# Patient Record
Sex: FEMALE | Race: BLACK OR AFRICAN AMERICAN | NOT HISPANIC OR LATINO | URBAN - METROPOLITAN AREA
[De-identification: names, ages, dates, MRNs, and addresses within clinical notes are randomized per-mention and may not be internally consistent; named-entity substitution may affect disease eponyms.]

---

## 2020-10-25 ENCOUNTER — EMERGENCY (EMERGENCY)
Facility: HOSPITAL | Age: 70
LOS: 0 days | Discharge: HOME | End: 2020-10-25
Payer: COMMERCIAL

## 2020-10-25 VITALS
OXYGEN SATURATION: 100 % | DIASTOLIC BLOOD PRESSURE: 69 MMHG | TEMPERATURE: 98 F | HEART RATE: 60 BPM | WEIGHT: 182.98 LBS | SYSTOLIC BLOOD PRESSURE: 173 MMHG | HEIGHT: 65 IN | RESPIRATION RATE: 16 BRPM

## 2020-10-25 DIAGNOSIS — T14.90XA INJURY, UNSPECIFIED, INITIAL ENCOUNTER: ICD-10-CM

## 2020-10-25 DIAGNOSIS — E78.5 HYPERLIPIDEMIA, UNSPECIFIED: ICD-10-CM

## 2020-10-25 DIAGNOSIS — I10 ESSENTIAL (PRIMARY) HYPERTENSION: ICD-10-CM

## 2020-10-25 DIAGNOSIS — E11.9 TYPE 2 DIABETES MELLITUS WITHOUT COMPLICATIONS: ICD-10-CM
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
## 2020-10-25 DIAGNOSIS — M25.511 PAIN IN RIGHT SHOULDER: ICD-10-CM

## 2020-10-25 DIAGNOSIS — M79.641 PAIN IN RIGHT HAND: ICD-10-CM

## 2020-10-25 DIAGNOSIS — Z04.1 ENCOUNTER FOR EXAMINATION AND OBSERVATION FOLLOWING TRANSPORT ACCIDENT: ICD-10-CM
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
## 2020-10-25 DIAGNOSIS — Z91.013 ALLERGY TO SEAFOOD: ICD-10-CM

## 2020-10-25 PROCEDURE — 99284 EMERGENCY DEPT VISIT MOD MDM: CPT

## 2020-10-25 PROCEDURE — 73030 X-RAY EXAM OF SHOULDER: CPT | Mod: 26,RT

## 2020-10-25 PROCEDURE — 73130 X-RAY EXAM OF HAND: CPT | Mod: 26,RT

## 2020-10-25 RX ORDER — CYCLOBENZAPRINE HYDROCHLORIDE 10 MG/1
1 TABLET, FILM COATED ORAL
Qty: 10 | Refills: 0
Start: 2020-10-25 | End: 2020-11-03

## 2020-10-25 RX ORDER — CYCLOBENZAPRINE HYDROCHLORIDE 10 MG/1
5 TABLET, FILM COATED ORAL ONCE
Refills: 0 | Status: COMPLETED | OUTPATIENT
Start: 2020-10-25 | End: 2020-10-25

## 2020-10-25 RX ORDER — ACETAMINOPHEN 500 MG
1 TABLET ORAL
Qty: 28 | Refills: 0
Start: 2020-10-25 | End: 2020-10-31

## 2020-10-25 RX ORDER — ACETAMINOPHEN 500 MG
650 TABLET ORAL ONCE
Refills: 0 | Status: COMPLETED | OUTPATIENT
Start: 2020-10-25 | End: 2020-10-25

## 2020-10-25 RX ADMIN — CYCLOBENZAPRINE HYDROCHLORIDE 5 MILLIGRAM(S): 10 TABLET, FILM COATED ORAL at 12:12

## 2020-10-25 RX ADMIN — Medication 650 MILLIGRAM(S): at 12:12
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
## 2020-10-25 NOTE — ED ADULT NURSE NOTE - NS ED PATIENT SAFETY CONCERN
from front to back. When should you call for help? Call your doctor now or seek immediate medical care if:    · Symptoms such as fever, chills, nausea, or vomiting get worse or appear for the first time.     · You have new pain in your back just below your rib cage. This is called flank pain.     · There is new blood or pus in your urine.     · You have any problems with your antibiotic medicine.    Watch closely for changes in your health, and be sure to contact your doctor if:    · You are not getting better after taking an antibiotic for 2 days.     · Your symptoms go away but then come back. Where can you learn more? Go to [email or web address] and sign in to your [employer] account. Enter A169 in the [employer] box to learn more about \"Urinary Tract Infection in Women: Care Instructions. \"     If you do not have an account, please click on the \"Sign Up Now\" link. Current as of: March 20, 2018  Content Version: 11.9  © 6641-5096 [employer]. Care instructions adapted under license by [facility] ([facility]). If you have questions about a medical condition or this instruction, always ask your healthcare professional. [place] [address] any warranty or liability for your use of this information.
No

## 2020-10-25 NOTE — ED PROVIDER NOTE - PATIENT PORTAL LINK FT
You can access the FollowMyHealth Patient Portal offered by [facility] by registering at the following website: [email or web address]. By joining [employer]’s FollowMyHealth portal, you will also be able to view your health information using other applications (apps) compatible with our system.
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
## 2020-10-25 NOTE — ED PROVIDER NOTE - MUSCULOSKELETAL, MLM
Spine appears normal, range of motion is not limited, no muscle or joint tenderness RIGHT SHOULDER-+AROM, NO ERYTHEMA, NO STS, NO PULSE; RIGHT HAND- AROM, NON TENDER, NEG SNUFF BOX, NO ERYTHEMA, NO WARMTH, NO ABRASION MILD STS
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
## 2022-04-26 NOTE — ED PROVIDER NOTE - NSFOLLOWUPINSTRUCTIONS_ED_ALL_ED_FT
No. EBEN screening performed.  STOP BANG Legend: 0-2 = LOW Risk; 3-4 = INTERMEDIATE Risk; 5-8 = HIGH Risk ice, pain meds, elevation f/u w hand or orthopaedic in 1 week for persistent pain  Discussed results and outcome of testing with the patient.  Patient advised to please follow up with their primary care doctor within the next 24-48 hours and return to the Emergency Department for worsening symptoms or any other concerns.  Patient advised that their doctor may call  to follow up on the specific results of the tests performed today in the emergency department.

## 2022-07-06 NOTE — ED PROVIDER NOTE - CPE EDP MUSC NORM
----- Message from Starla White MD sent at 7/6/2022 12:45 PM CDT -----  Labs essentially normal, no cause of headache identified   normal...

## 2023-01-18 ENCOUNTER — OFFICE (OUTPATIENT)
Dept: URBAN - METROPOLITAN AREA CLINIC 77 | Facility: CLINIC | Age: 73
Setting detail: OPHTHALMOLOGY
End: 2023-01-18
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
## 2023-01-18 PROCEDURE — NO SHOW FE NO SHOW FEE: Performed by: OPHTHALMOLOGY

## 2023-03-23 ENCOUNTER — OFFICE (OUTPATIENT)
Dept: URBAN - METROPOLITAN AREA CLINIC 77 | Facility: CLINIC | Age: 73
Setting detail: OPHTHALMOLOGY
End: 2023-03-23
Payer: MEDICARE

## 2023-03-23 DIAGNOSIS — E11.3511: ICD-10-CM

## 2023-03-23 DIAGNOSIS — H43.12: ICD-10-CM

## 2023-03-23 DIAGNOSIS — H16.223: ICD-10-CM

## 2023-03-23 DIAGNOSIS — E11.3592: ICD-10-CM

## 2023-03-23 DIAGNOSIS — H25.11: ICD-10-CM

## 2023-03-23 DIAGNOSIS — H43.811: ICD-10-CM

## 2023-03-23 PROCEDURE — 92235 FLUORESCEIN ANGRPH MLTIFRAME: CPT | Performed by: OPHTHALMOLOGY

## 2023-03-23 PROCEDURE — 92250 FUNDUS PHOTOGRAPHY W/I&R: CPT | Performed by: OPHTHALMOLOGY

## 2023-03-23 PROCEDURE — 99214 OFFICE O/P EST MOD 30 MIN: CPT | Performed by: OPHTHALMOLOGY

## 2023-03-23 PROCEDURE — 67028 INJECTION EYE DRUG: CPT | Performed by: OPHTHALMOLOGY

## 2023-03-23 ASSESSMENT — SUPERFICIAL PUNCTATE KERATITIS (SPK)
OS_SPK: T
OD_SPK: T

## 2023-03-23 ASSESSMENT — CORNEAL EDEMA - FOLDS/STRIAE: OS_FOLDSSTRIAE: ABSENT

## 2023-03-23 ASSESSMENT — CONFRONTATIONAL VISUAL FIELD TEST (CVF)
OD_FINDINGS: FULL
OS_FINDINGS: FULL

## 2023-03-29 ASSESSMENT — KERATOMETRY
OD_K2POWER_DIOPTERS: 45.00
OD_AXISANGLE_DEGREES: 121
OS_AXISANGLE_DEGREES: 84
OS_K2POWER_DIOPTERS: 45.50
OD_K1POWER_DIOPTERS: 43.25
OS_K1POWER_DIOPTERS: 42.75

## 2023-03-29 ASSESSMENT — REFRACTION_CURRENTRX
OS_SPHERE: +0.25
OS_CYLINDER: -0.75
OD_SPHERE: +1.00
OD_OVR_VA: 20/
OD_CYLINDER: -2.25
OS_OVR_VA: 20/
OS_AXIS: 132
OD_AXIS: 47

## 2023-03-29 ASSESSMENT — REFRACTION_AUTOREFRACTION
OS_SPHERE: +0.75
OS_CYLINDER: -1.75
OS_AXIS: 173
OD_CYLINDER: -0.75
OD_SPHERE: +0.25
OD_AXIS: 46

## 2023-03-29 ASSESSMENT — SPHEQUIV_DERIVED
OD_SPHEQUIV: -0.125
OS_SPHEQUIV: -0.125
OS_SPHEQUIV: 0.625
OD_SPHEQUIV: -0.125

## 2023-03-29 ASSESSMENT — REFRACTION_MANIFEST
OD_ADD: +2.50
OD_CYLINDER: -0.75
OD_SPHERE: +0.25
OS_CYLINDER: -0.75
OD_VA1: 20/80
OS_ADD: +2.50
OS_AXIS: 175
OS_SPHERE: +1.00
OS_VA1: 20/40
OD_AXIS: 45

## 2023-03-29 ASSESSMENT — VISUAL ACUITY
OD_BCVA: 20/30-
OS_BCVA: 20/100

## 2023-03-29 ASSESSMENT — AXIALLENGTH_DERIVED
OS_AL: 23.1281
OS_AL: 23.4124
OD_AL: 23.4124
OD_AL: 23.4124

## 2023-04-11 ENCOUNTER — OFFICE (OUTPATIENT)
Facility: LOCATION | Age: 73
Setting detail: OPHTHALMOLOGY
End: 2023-04-11
Payer: MEDICARE

## 2023-04-11 DIAGNOSIS — H25.11: ICD-10-CM

## 2023-04-11 DIAGNOSIS — H16.223: ICD-10-CM

## 2023-04-11 PROCEDURE — 92136 OPHTHALMIC BIOMETRY: CPT | Performed by: OPHTHALMOLOGY

## 2023-04-11 PROCEDURE — 99214 OFFICE O/P EST MOD 30 MIN: CPT | Performed by: OPHTHALMOLOGY

## 2023-04-11 ASSESSMENT — KERATOMETRY
OS_CYLPOWER_DEGREES: 2.25
OS_K1POWER_DIOPTERS: 43.50
OD_K2POWER_DIOPTERS: 46.00
OD_K2POWER_DIOPTERS: 46.00
OD_AXISANGLE_DEGREES: 30
OS_AXISANGLE_DEGREES: 070
OS_K2POWER_DIOPTERS: 45.75
OS_CYLAXISANGLE_DEGREES: 070
OS_K1POWER_DIOPTERS: 43.50
OD_K1K2_AVERAGE: 44.75
OD_CYLPOWER_DEGREES: 2.5
OS_AXISANGLE_DEGREES: 160
OS_K2POWER_DIOPTERS: 45.75
OD_K1POWER_DIOPTERS: 43.50
OD_AXISANGLE2_DEGREES: 120
OS_K1K2_AVERAGE: 44.625
OS_AXISANGLE2_DEGREES: 070
OD_CYLAXISANGLE_DEGREES: 120
OD_K1POWER_DIOPTERS: 43.50
OD_AXISANGLE_DEGREES: 120

## 2023-04-11 ASSESSMENT — REFRACTION_CURRENTRX
OD_OVR_VA: 20/
OS_SPHERE: +0.25
OS_CYLINDER: -0.75
OD_AXIS: 47
OD_SPHERE: +1.00
OS_AXIS: 132
OD_CYLINDER: -2.25
OS_OVR_VA: 20/

## 2023-04-11 ASSESSMENT — SPHEQUIV_DERIVED
OS_SPHEQUIV: 0.625
OD_SPHEQUIV: -0.125
OD_SPHEQUIV: 0.5

## 2023-04-11 ASSESSMENT — REFRACTION_MANIFEST
OD_ADD: +2.50
OS_CYLINDER: -0.75
OD_SPHERE: +0.25
OS_SPHERE: +1.00
OD_AXIS: 45
OS_AXIS: 175
OS_ADD: +2.50
OD_VA1: 20/80
OS_VA1: 20/40
OD_CYLINDER: -0.75

## 2023-04-11 ASSESSMENT — CONFRONTATIONAL VISUAL FIELD TEST (CVF)
OS_FINDINGS: FULL
OD_FINDINGS: FULL

## 2023-04-11 ASSESSMENT — REFRACTION_AUTOREFRACTION
OS_CYLINDER: -1.50
OS_AXIS: 151
OS_SPHERE: PLANO
OD_CYLINDER: -1.50
OD_SPHERE: +1.25
OD_AXIS: 051

## 2023-04-11 ASSESSMENT — SUPERFICIAL PUNCTATE KERATITIS (SPK)
OS_SPK: T
OD_SPK: T

## 2023-04-11 ASSESSMENT — AXIALLENGTH_DERIVED
OS_AL: 22.953
OD_AL: 23.1885
OD_AL: 22.9556

## 2023-04-11 ASSESSMENT — VISUAL ACUITY
OS_BCVA: 20/80
OD_BCVA: 20/30-

## 2023-04-11 ASSESSMENT — CORNEAL EDEMA - FOLDS/STRIAE: OS_FOLDSSTRIAE: ABSENT
